# Patient Record
Sex: FEMALE | Race: WHITE | NOT HISPANIC OR LATINO | Employment: UNEMPLOYED | URBAN - NONMETROPOLITAN AREA
[De-identification: names, ages, dates, MRNs, and addresses within clinical notes are randomized per-mention and may not be internally consistent; named-entity substitution may affect disease eponyms.]

---

## 2017-03-31 ENCOUNTER — HOSPITAL ENCOUNTER (EMERGENCY)
Facility: HOSPITAL | Age: 42
Discharge: HOME | End: 2017-03-31
Admitting: EMERGENCY MEDICINE
Payer: COMMERCIAL

## 2017-03-31 VITALS
DIASTOLIC BLOOD PRESSURE: 65 MMHG | WEIGHT: 285.72 LBS | RESPIRATION RATE: 18 BRPM | TEMPERATURE: 97.9 F | SYSTOLIC BLOOD PRESSURE: 127 MMHG | HEART RATE: 85 BPM | OXYGEN SATURATION: 96 %

## 2017-03-31 DIAGNOSIS — M54.50 ACUTE LEFT-SIDED LOW BACK PAIN WITHOUT SCIATICA: Primary | ICD-10-CM

## 2017-03-31 LAB — HCG UR QL: NEGATIVE

## 2017-03-31 PROCEDURE — 99283 EMERGENCY DEPT VISIT LOW MDM: CPT

## 2017-03-31 PROCEDURE — 81025 URINE PREGNANCY TEST: CPT | Performed by: PHYSICIAN ASSISTANT

## 2017-03-31 PROCEDURE — 96372 THER/PROPH/DIAG INJ SC/IM: CPT

## 2017-03-31 RX ORDER — CYCLOBENZAPRINE HCL 5 MG
5 TABLET ORAL 3 TIMES DAILY PRN
Qty: 30 TABLET | Refills: 0 | Status: SHIPPED | OUTPATIENT
Start: 2017-03-31

## 2017-03-31 RX ORDER — KETOROLAC TROMETHAMINE 30 MG/ML
30 INJECTION, SOLUTION INTRAMUSCULAR; INTRAVENOUS ONCE
Status: COMPLETED | OUTPATIENT
Start: 2017-03-31 | End: 2017-03-31

## 2017-03-31 RX ORDER — LISINOPRIL AND HYDROCHLOROTHIAZIDE 20; 12.5 MG/1; MG/1
1 TABLET ORAL DAILY
COMMUNITY

## 2017-03-31 RX ORDER — TRAMADOL HYDROCHLORIDE 50 MG/1
50 TABLET ORAL EVERY 6 HOURS PRN
COMMUNITY

## 2017-03-31 RX ADMIN — KETOROLAC TROMETHAMINE 30 MG: 30 INJECTION, SOLUTION INTRAMUSCULAR at 13:23

## 2017-04-19 ENCOUNTER — APPOINTMENT (OUTPATIENT)
Dept: PHYSICAL THERAPY | Facility: CLINIC | Age: 42
End: 2017-04-19
Payer: COMMERCIAL

## 2017-04-19 PROCEDURE — 97140 MANUAL THERAPY 1/> REGIONS: CPT

## 2017-04-19 PROCEDURE — 97014 ELECTRIC STIMULATION THERAPY: CPT

## 2017-04-19 PROCEDURE — 97162 PT EVAL MOD COMPLEX 30 MIN: CPT

## 2017-04-21 ENCOUNTER — APPOINTMENT (OUTPATIENT)
Dept: PHYSICAL THERAPY | Facility: CLINIC | Age: 42
End: 2017-04-21
Payer: COMMERCIAL

## 2017-04-21 PROCEDURE — 97140 MANUAL THERAPY 1/> REGIONS: CPT

## 2017-04-21 PROCEDURE — 97110 THERAPEUTIC EXERCISES: CPT

## 2017-04-21 PROCEDURE — 97014 ELECTRIC STIMULATION THERAPY: CPT

## 2017-04-25 ENCOUNTER — APPOINTMENT (OUTPATIENT)
Dept: PHYSICAL THERAPY | Facility: CLINIC | Age: 42
End: 2017-04-25
Payer: COMMERCIAL

## 2019-01-01 ENCOUNTER — TELEPHONE (OUTPATIENT)
Dept: BEHAVIORAL/MENTAL HEALTH CLINIC | Facility: CLINIC | Age: 44
End: 2019-01-01

## 2019-01-01 ENCOUNTER — SOCIAL WORK (OUTPATIENT)
Dept: BEHAVIORAL/MENTAL HEALTH CLINIC | Facility: CLINIC | Age: 44
End: 2019-01-01
Payer: COMMERCIAL

## 2019-01-01 DIAGNOSIS — F31.30 BIPOLAR AFFECTIVE DISORDER, CURRENT EPISODE DEPRESSED, CURRENT EPISODE SEVERITY UNSPECIFIED (HCC): Primary | ICD-10-CM

## 2019-01-01 DIAGNOSIS — F31.32 MODERATE DEPRESSED BIPOLAR I DISORDER (HCC): Primary | ICD-10-CM

## 2019-01-01 DIAGNOSIS — F31.30 BIPOLAR AFFECTIVE DISORDER, CURRENT EPISODE DEPRESSED, CURRENT EPISODE SEVERITY UNSPECIFIED (HCC): ICD-10-CM

## 2019-01-01 PROCEDURE — T1015 CLINIC SERVICE: HCPCS | Performed by: SOCIAL WORKER

## 2019-04-29 ENCOUNTER — OFFICE VISIT (OUTPATIENT)
Dept: BEHAVIORAL/MENTAL HEALTH CLINIC | Facility: CLINIC | Age: 44
End: 2019-04-29
Payer: COMMERCIAL

## 2019-04-29 DIAGNOSIS — F31.32 MODERATE DEPRESSED BIPOLAR I DISORDER (HCC): Primary | ICD-10-CM

## 2019-04-29 PROBLEM — F31.9 BIPOLAR DISORDER, UNSPECIFIED (HCC): Status: ACTIVE | Noted: 2019-04-29

## 2019-04-29 PROCEDURE — T1015 CLINIC SERVICE: HCPCS | Performed by: FAMILY MEDICINE

## 2019-05-13 ENCOUNTER — OFFICE VISIT (OUTPATIENT)
Dept: BEHAVIORAL/MENTAL HEALTH CLINIC | Facility: CLINIC | Age: 44
End: 2019-05-13
Payer: COMMERCIAL

## 2019-05-13 DIAGNOSIS — F31.32 MODERATE DEPRESSED BIPOLAR I DISORDER (HCC): Primary | ICD-10-CM

## 2019-05-13 PROCEDURE — T1015 CLINIC SERVICE: HCPCS | Performed by: FAMILY MEDICINE

## 2019-05-21 ENCOUNTER — OFFICE VISIT (OUTPATIENT)
Dept: BEHAVIORAL/MENTAL HEALTH CLINIC | Facility: CLINIC | Age: 44
End: 2019-05-21
Payer: COMMERCIAL

## 2019-05-21 DIAGNOSIS — F31.32 MODERATE DEPRESSED BIPOLAR I DISORDER (HCC): Primary | ICD-10-CM

## 2019-05-21 PROCEDURE — T1015 CLINIC SERVICE: HCPCS | Performed by: FAMILY MEDICINE

## 2019-05-31 ENCOUNTER — OFFICE VISIT (OUTPATIENT)
Dept: BEHAVIORAL/MENTAL HEALTH CLINIC | Facility: CLINIC | Age: 44
End: 2019-05-31
Payer: COMMERCIAL

## 2019-05-31 DIAGNOSIS — F31.32 MODERATE DEPRESSED BIPOLAR I DISORDER (HCC): Primary | ICD-10-CM

## 2019-05-31 PROCEDURE — T1015 CLINIC SERVICE: HCPCS | Performed by: FAMILY MEDICINE

## 2019-06-06 ENCOUNTER — OFFICE VISIT (OUTPATIENT)
Dept: BEHAVIORAL/MENTAL HEALTH CLINIC | Facility: CLINIC | Age: 44
End: 2019-06-06
Payer: COMMERCIAL

## 2019-06-06 DIAGNOSIS — F31.32 MODERATE DEPRESSED BIPOLAR I DISORDER (HCC): Primary | ICD-10-CM

## 2019-06-06 PROCEDURE — T1015 CLINIC SERVICE: HCPCS | Performed by: FAMILY MEDICINE

## 2019-06-20 ENCOUNTER — OFFICE VISIT (OUTPATIENT)
Dept: BEHAVIORAL/MENTAL HEALTH CLINIC | Facility: CLINIC | Age: 44
End: 2019-06-20
Payer: COMMERCIAL

## 2019-06-20 DIAGNOSIS — F31.32 MODERATE DEPRESSED BIPOLAR I DISORDER (HCC): Primary | ICD-10-CM

## 2019-06-20 PROCEDURE — T1015 CLINIC SERVICE: HCPCS | Performed by: FAMILY MEDICINE

## 2019-06-28 ENCOUNTER — TELEPHONE (OUTPATIENT)
Dept: BEHAVIORAL/MENTAL HEALTH CLINIC | Facility: CLINIC | Age: 44
End: 2019-06-28

## 2019-06-30 ENCOUNTER — SOCIAL WORK (OUTPATIENT)
Dept: BEHAVIORAL/MENTAL HEALTH CLINIC | Facility: CLINIC | Age: 44
End: 2019-06-30
Payer: COMMERCIAL

## 2019-06-30 DIAGNOSIS — F31.32 MODERATE DEPRESSED BIPOLAR I DISORDER (HCC): Primary | ICD-10-CM

## 2019-06-30 PROCEDURE — T1015 CLINIC SERVICE: HCPCS | Performed by: SOCIAL WORKER

## 2019-08-15 NOTE — TELEPHONE ENCOUNTER
Left a message to schedule appointment with [facility] for Psychiatrist assessment and medication management  Left the phone number of the Client's cell phone as well as the phone [phone]

## 2019-08-15 NOTE — PSYCH
Treatment Plan Tracking    Second Treatment Plan not completed within required time limits due to: Client cancelled/no-showed scheduled appointment  Chuck Ware

## 2019-08-15 NOTE — PSYCH
Armando Eason  1975         Date of Initial Treatment Plan:05/13/2019   Date of Current Treatment Plan: 08/15/19     Treatment Plan Number; Second Treatment Plan     Strengths/Personal Resources for Self Care: Madelin Keenan has a supportive family of origin, and life partner  She has a long history of employment prior to her physical illness  Presently she is open with Dr Cristian Tobar for her physical health at [facility], and this therapist Becca Turcios MSW LCSW at [address] [place] [address]       Diagnosis:   1  Moderate depressed bipolar I disorder (HCC)            Area of Needs: Madelin Keenan has a long history of depression and anxiety which has caused weaknesses within her different relationships and environments, by addressing these issues it is hoped to increase her strengths within those areas  Focusing on these issues  it is hoped that she will achieve or maintain maximum functional capacity in performing daily activities, taking into account both the functional capacity of the individual and those functional capacities appropriate for the individuals of the same age  Reduce or ameliorate the physical mental, behavioral, or developmental effects of an illness, condition, injury or disability   Present treatment plan will cover 4 months or 12 visits which ever comes first       Present treatment plan will cover the next 4 months or 12 visits which ever comes first          Long Term Goal 1: Suyapa's depression score on her PHQ-9 will decrease from 17 to 10 or less     Target Date:05/13/2020  Completion Date:          Short Term Objectives for Goal 1: Madelin Keenan will develop 2 coping skills during the therapic sessions to help avoid napping/sleeping to escape other people and activities       Long Term Goal 2: Suyapa's anxiety score on her KELLIE-7 will decrease from 11 to 5 or less     Target Date: 05/13/2020  Completion Date:      Short Term Objectives for Goal 2: BODØ will learn two new ways of coping with routine stress          Long Term Goal # 3: BODØ will attend all of her medical providers appointments 100% of the time      Target Date: 05/13/2020  Completion Date:         GOAL 1: Modality: The person(s) responsible for carrying out the plan is  BODØ and this therapist Sonia JON     GOAL 2: Modality: The person(s) responsible for carrying out the plan is  BODØ and this therapist Sonia JON      GOAL 3: Modality: The person(s) responsible for carrying out the plan is  BODØ, and her medical providers   [address] [place] [address]: Diagnosis and Treatment Plan explained to Khushi Garibay relates understanding diagnosis and is agreeable to Treatment Plan          Client Comments : Please share your thoughts, feelings, need and/or experiences regarding your treatment plan:     Marthas short term treatment plan will be reviewed/or completed on or before 12/15/2020   __________________________________________________________________     __________________________________________________________________     __________________________________________________________________     __________________________________________________________________     _______________________________________                     Patient signature, Date Time: __________________________________________               Physician cosigner signature, Date, Time: ________________________________
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
## 2019-09-09 NOTE — TELEPHONE ENCOUNTER
Left a message for the Client to contact this  at the [facility] [place]  Phone number was left  [phone]

## 2019-09-11 NOTE — PSYCH
Psychotherapy Provided: Individual Psychotherapy 60  minutes          Goals addressed in session:Long Term Goal Number 2: Zeinab Benítez reported that she met with her new Psychiatrist  Clint Lynn, and has been placed on new medication to address her depression and anxiety  During the session we explored her ace scores and the negative and positive incidents within her life  It is hoped that by addressing her past traumatic history this will lead to increase strengths within her different relationships and environments  Interventions: Supportive Therapy, Strengths Therapy,  and Cognitive Behavioral Therapy where the treatment modalities utilized during the session  Assessment and Plan: Zeinab Benítez presented an anxious mood that was congruent in effect  She was alert, goal directed and participated with prompts during the session  Zeinab Benítez was oriented to person, place, time, situation, and reported no suicidal/homicidal ideation, plan, or intent  As team we conducted the ACE survey and Zeinab Benítez scored 7 out of 10 which places her in the high level for both mental and physical health as well as contact with the criminal justice system  During the session we also conducted screenings on the positive and negative issues within her life  Zeinab Benítez was an active team member during the therapy session  As team Zeinab Benítez and her support team set as her home commitment for her to practice her coping skills daily  Next scheduled appointment was set for 2 weeks  Pain:      PSYCH MENTAL STATUS PAIN :5    PHYSICAL PAIN SCALE NUMBERS:7    Current suicide risk : Low/Phone number for [facility] was given  to the Client [phone] [address]: Diagnosis and Treatment Plan explained to Yesika Alfred, Yesika Alfred  relates understanding diagnosis and is agreeable to Treatment Plan  Yes

## 2019-09-25 NOTE — PSYCH
Psychotherapy Provided: Individual Psychotherapy 60  minutes          Goals addressed in session: Long Term Goal Number One: Maximo Palacios reported her partner is cheating on her with another women at work, this issue has increased her anxiety and depression  During the session we explored setting boundaries within her relationships to prevent environmental and relationship weaknesses  It is hoped that by addressing her boundary issues this will prevent the need for higher level of care and services  Interventions: Supportive Therapy, Strengths Therapy,  and Cognitive Behavioral Therapy where the treatment modalities utilized during the session  Assessment and Plan: Maximo Palacios presented an anxious, depressed mood that was congruent in effect  She was alert, goal directed and participated with prompts during the session  Maximo Palacios was oriented to person, place, time, situation, and reported no suicidal/homicidal ideation, plan, or intent  As team we explored her relationship with her Partner and developing healthy boundaries  Communication patterns where explored as well as coping skills with Maximo Palacios reporting decrease in her depression and anxiety  Next scheduled appointment set of 3 weeks  Maximo Palacios and her support team set as her home commitment to journalize her feelings and utilize her coping skills within her different relationships and environments  Pain:      PSYCH MENTAL STATUS PAIN :5    PHYSICAL PAIN SCALE NUMBERS:7    Current suicide risk : Low/Phone number for [facility] was given  to the Client [phone]    [address]: Diagnosis and Treatment Plan explained to Sanju Archuleta, Sanju Archuleta  relates understanding diagnosis and is agreeable to Treatment Plan  Yes

## 2019-10-15 NOTE — PSYCH
Psychotherapy Provided: Individual Psychotherapy 60  minutes          Goals addressed in session: Long Term Goal Number 2: The Client reported that her economic issues have improved somewhat  She recently put money on her taxes to prevent her home going up for 's sale  She reported her psychiatric medication are starting to work, and her depression has decreased  During the session we explored her anxiety and depression and the effects on her different relationship and environments  Coping skills where reviewed during the session to address and reduce the effects of  her anxiety and depression  Interventions: Supportive Therapy, Strengths Therapy,  and Cognitive Behavioral Therapy where the treatment modalities utilized during the session  Assessment and Plan:The Client presented an anxious and depressed moods that was congruent in effect  She was alert, goal directed and participated with prompts during the session  The Client was oriented to person, place, time, situation, and reported no suicidal/homicidal ideation, plan, or intent  As team we explored her anxiety and depression  She was able to verbalize the triggers for her depression and anxiety as being economic, relationship, and pain issues  Coping skill container was explored and practiced during the session with the client verbalizing decrease in her anxiety and depression  Next scheduled appointment set for 2 weeks  Pain:      PSYCH MENTAL STATUS PAIN :5    PHYSICAL PAIN SCALE NUMBERS:7    Current suicide risk : Low/Phone number for [facility] was given  to the Client [phone]    [address]: Diagnosis and Treatment Plan explained to Cheko Maldonado, Cheko Maldonado  relates understanding diagnosis and is agreeable to Treatment Plan  Yes

## 2019-10-29 NOTE — PSYCH
Psychotherapy Provided: Individual Psychotherapy 60  minutes          Goals addressed in session:Long Term Goal Number One: The Client reported continued problems within her relationship with her Partner, which has caused a recent spike in her depression  During the session we explored her depression and anxiety within her relationships and environments  It is hoped that by addressing her depression this will prevent the need for higher level of care and services  Interventions: Supportive Therapy, Strengths Therapy,  and Cognitive Behavioral Therapy where the treatment modalities utilized during the session  Assessment and Plan: The Client presented an anxious depressed mood that was congruent in effect  She was alert, goal directed and participated with prompts during the session  The Client was oriented to person, place, time, situation, and reported no suicidal/homicidal ideation, plan, or intent  As team we explored her depression and the connection with her relationship with her Partner  Communication skills here reviewed and practiced during the session to help address her relationship weaknesses  Coping skills where reviewed during the session with the Client reporting decrease in her depression  The Client set as her home commitment to continue to practice her communication skills within her relationship with her Partner  Next Scheduled appointment set for 2 weeks  Pain:      PSYCH MENTAL STATUS PAIN :5    PHYSICAL PAIN SCALE NUMBERS:7- Due to Back Pain    Current suicide risk : Low/Phone number for [facility] was given  to the Client [address]: Diagnosis and Treatment Plan explained to Tay Morfin, Tay Morfin  relates understanding diagnosis and is agreeable to Treatment Plan  Yes

## 2019-11-12 NOTE — PSYCH
Psychotherapy Provided: Individual Psychotherapy 60  minutes          Goals addressed in session:Long Term Number One: The Client reported that she has been having breakthroughs in her depression, and has been addressing this issue with her psychiatric medication provider  As team we explored her strengths and weaknesses and heathy ways to address her depression through coping skills  It is hoped that by addressing her depression this will prevent the need for higher level of care ans services  Interventions: Supportive Therapy, Strengths Therapy,  and Cognitive Behavioral Therapy where the treatment modalities utilized during the session  Assessment and Plan:The Client presented a depressed, anxious mood that was congruent in effect  She was alert, goal directed and participated with prompts during the session  The Client was oriented to person, place, time, situation, and reported no suicidal/homicidal ideation, plan, or intent  As team we explored her personal and environmental strengths, with the Client being able to verbalize strong family, friends, and resiliency  She was able to identify her weakness as being her relationship with her partner, her physical health and economic issues  Community support list was given to the Client to help with her economic issues  Review of her coping skills was conducted during the session with the Client reporting feeling calmer  As her home commitment the Client will utilize her coping skills when presented with anxiety and or depression  Next appointment was set for 2 weeks       Pain:      PSYCH MENTAL STATUS PAIN :6 Client reported depression     PHYSICAL PAIN SCALE NUMBERS:8 Client reported pain in her back    Current suicide risk : Low/Phone number for [facility] was given  to the Client [phone] [address]: Diagnosis and Treatment Plan explained to Nuno Morales, Nuno Morales  relates understanding diagnosis and is agreeable to Treatment Plan  Yes

## 2019-11-27 NOTE — PSYCH
Psychotherapy Provided: Individual Psychotherapy 60  minutes          Goals addressed in session: Long Term Goal Number One: The Client reported that she is still suffering from pain in her back, which has increased her depression and anxiety  She did report positive results with her son at his 2 year check with his pediatrician   "the doctor told me he has no issue and is on level" During the session we explored ages and stages and the effects of her worry on her relationships  It is hoped that by addressing her weaknesses this will prevent the need for higher level of care  Interventions: Supportive Therapy, Strengths Therapy,  and Cognitive Behavioral Therapy where the treatment modalities utilized during the session  Assessment and Plan:The Client presented an anxious depressed mood that was congruent in effect  She was alert, goal directed and participated with prompts during the session  The Client was oriented to person, place, time, situation, and reported no suicidal/homicidal ideation, plan, or intent  As team we explored children's ages and stages in development  The Client reported feeling calmer after completion of this activity, with increased awareness of her son's growth  During the session we explored how her stress effects her mental health and relationships  Coping skills where reviewed to address her mental health  The Client and her support team set as her home commitment to journalize her feelings and moods  Pain:      PSYCH MENTAL STATUS PAIN :5-Depression and anxiety    PHYSICAL PAIN SCALE NUMBERS:7-Back Pain    Current suicide risk : Low/Phone number for [facility] was given  to the Client [phone] [address]: Diagnosis and Treatment Plan explained to Tay Morfin, Tay Morfin  relates understanding diagnosis and is agreeable to Treatment Plan  Yes

## 2019-12-11 NOTE — PSYCH
Ольга Men  1975         Date of Initial Treatment Plan:05/13/2019   Date of Current Treatment Plan: 12/12/2019     Treatment Plan Number; Third Treatment Plan     Strengths/Personal Resources for Self Care: Gerber Arguello has a supportive family of origin, and life partner  She has a long history of employment prior to her physical illness  Presently she is open with Dr Lopez Cortes for her physical health at [facility], and this therapist Jeannette Amador MSW LCSW at [address]       Diagnosis:   1  Moderate depressed bipolar I disorder (HCC)            Area of Needs: Gerber Arguello has a long history of depression and anxiety which has caused weaknesses within her different relationships and environments, by addressing these issues it is hoped to increase her strengths within those areas  Focusing on these issues  it is hoped that she will achieve or maintain maximum functional capacity in performing daily activities, taking into account both the functional capacity of the individual and those functional capacities appropriate for the individuals of the same age  Reduce or ameliorate the physical mental, behavioral, or developmental effects of an illness, condition, injury or disability   Present treatment plan will cover 4 months or 12 visits which ever comes first        Present treatment plan will cover the next 4 months or 12 visits which ever comes first          Long Term Goal 1: Suyapa's depression score on her PHQ-9 will decrease from 17 to 10 or less (emerging)     Target Date:05/13/2020  Completion Date:          Short Term Objectives for Goal 1: Suyapa will develop 2 coping skills during the therapic sessions to help avoid napping/sleeping to escape other people and activities  (emerging)     Long Term Goal 2: Suyapa's anxiety score on her KELLIE-7 will decrease from 11 to 5 or less (emerging)     Target Date: 05/13/2020  Completion Date:    Short Term Objectives for Goal 2: Suyapa will learn two new ways of coping with routine stress          Long Term Goal # 3: Quiana Potts will attend all of her medical providers appointments 100% of the time      Target Date: 05/13/2020  Completion Date:         GOAL 1: Modality: The person(s) responsible for carrying out the plan is  Suyapa and this therapist Miquel COTTON [facility]     GOAL 2: Modality: The person(s) responsible for carrying out the plan is  Suyapa and this therapist Miquel COTTON [facility]      GOAL 3: Modality: The person(s) responsible for carrying out the plan is  Suyapa, and her medical providers         [address] [place] [address]: Diagnosis and Treatment Plan explained to Suyapa Dale relates understanding diagnosis and is agreeable to Treatment Plan          Client Comments : Please share your thoughts, feelings, need and/or experiences regarding your treatment plan:     Suyapa's short term treatment plan will be reviewed/or completed on or before 04/12/2020   __________________________________________________________________     __________________________________________________________________     __________________________________________________________________     __________________________________________________________________     _______________________________________                    Patient signature, Date Time: __________________________________________               Physician cosigner signature, Date, Time: ________________________________

## 2019-12-12 NOTE — PSYCH
Psychotherapy Provided: Individual Psychotherapy 60  minutes          Goals addressed in session:Creation of Second Treatment Plan: The Client reported problems within her relationships with her friend "Howard Raya" Reviewed of healthy boundaries reviewed during the session as well as creation of her recovery treatment plan  It is hoped that by addressing the Client's weaknesses this will prevent the need for higher level of care and services  Interventions: Supportive Therapy, Strengths Therapy,  and Cognitive Behavioral Therapy where the treatment modalities utilized during the session  Assessment and Plan: The Client presented an anxious depressed mood that was congruent in effect  She was alert, goal directed and participated with prompts during the session  The Client was oriented to person, place, time, situation, and reported no suicidal/homicidal ideation, plan, or intent  As team we explored boundaries with her relationship and healthy coping  skills in addressing her porous boundaries  During the session we also created her recovery treatment plan addressing her depression and anxiety  As her home commitment the Client will journalize her feelings and moods Next scheduled appointment 1 week  Pain:      PSYCH MENTAL STATUS PAIN :5    PHYSICAL PAIN SCALE NUMBERS:7    Current suicide risk : Low/Phone number for [facility] was given  to the Client [phone] [address]: Diagnosis and Treatment Plan explained to Marquetta Heimlich, Marquetta Heimlich  relates understanding diagnosis and is agreeable to Treatment Plan  Yes

## 2019-12-18 NOTE — PSYCH
Psychotherapy Provided: Individual Psychotherapy 60  minutes          Goals addressed in session:Long Term Goal Number One: The Client reported she has been having an increase in her anxiety and depression over relationship and environmental issues  As team we explored the clinical and personal symptoms of her depression and herbie  It is hoped that by addressing her bipolar symptoms  this will act as a preventive measure for  the need for higher level of care and services  Interventions:Supportive Therapy, Strengths Therapy,  and Cognitive Behavioral Therapy where the treatment modalities utilized during the session  Assessment and Plan:The Client presented a depressed anxious mood that was congruent in effect  She was alert, goal directed and participated with prompts during the session  The Client was oriented to person, place, time, situation, and reported no suicidal/homicidal ideation, plan, or intent  As team we explored the clinical symptoms of bipolar and the symptoms of bipolar that the Client as well experiences  The Client was an active team member during the session  As team we reviewed her coping skills to decrease her weaknesses within her different relationships and environments  As her home commitment the Client will utilize her coping skill of grounding when presented with anxiety and or depression  Next scheduled appointment was set for 2 weeks  Pain:      PSYCH MENTAL STATUS PAIN :5 as reported by the Client due to depression over the holiday  PHYSICAL PAIN SCALE NUMBERS:6 as reported by the Client due to back pain  Current suicide risk : Low/Phone number for [facility] was given  to the Client [phone] [address]: Diagnosis and Treatment Plan explained to Isaias Cortes, Isaias Cortes  relates understanding diagnosis and is agreeable to Treatment Plan  Yes

## 2020-01-01 ENCOUNTER — TELEMEDICINE (OUTPATIENT)
Dept: BEHAVIORAL/MENTAL HEALTH CLINIC | Facility: CLINIC | Age: 45
End: 2020-01-01
Payer: COMMERCIAL

## 2020-01-01 ENCOUNTER — APPOINTMENT (EMERGENCY)
Dept: RADIOLOGY | Facility: HOSPITAL | Age: 45
End: 2020-01-01
Payer: COMMERCIAL

## 2020-01-01 ENCOUNTER — HOSPITAL ENCOUNTER (EMERGENCY)
Facility: HOSPITAL | Age: 45
End: 2020-07-13
Attending: EMERGENCY MEDICINE | Admitting: EMERGENCY MEDICINE
Payer: COMMERCIAL

## 2020-01-01 ENCOUNTER — TELEPHONE (OUTPATIENT)
Dept: BEHAVIORAL/MENTAL HEALTH CLINIC | Facility: CLINIC | Age: 45
End: 2020-01-01

## 2020-01-01 ENCOUNTER — DOCUMENTATION (OUTPATIENT)
Dept: BEHAVIORAL/MENTAL HEALTH CLINIC | Facility: CLINIC | Age: 45
End: 2020-01-01

## 2020-01-01 VITALS
TEMPERATURE: 95.6 F | DIASTOLIC BLOOD PRESSURE: 73 MMHG | OXYGEN SATURATION: 84 % | RESPIRATION RATE: 145 BRPM | SYSTOLIC BLOOD PRESSURE: 115 MMHG

## 2020-01-01 DIAGNOSIS — F31.30 BIPOLAR AFFECTIVE DISORDER, CURRENT EPISODE DEPRESSED, CURRENT EPISODE SEVERITY UNSPECIFIED (HCC): ICD-10-CM

## 2020-01-01 DIAGNOSIS — F31.32 MODERATE DEPRESSED BIPOLAR I DISORDER (HCC): Primary | ICD-10-CM

## 2020-01-01 DIAGNOSIS — R06.03 RESPIRATORY DISTRESS: ICD-10-CM

## 2020-01-01 DIAGNOSIS — I46.9 CARDIAC ARREST (HCC): Primary | ICD-10-CM

## 2020-01-01 DIAGNOSIS — F31.30 BIPOLAR AFFECTIVE DISORDER, CURRENT EPISODE DEPRESSED, CURRENT EPISODE SEVERITY UNSPECIFIED (HCC): Primary | ICD-10-CM

## 2020-01-01 DIAGNOSIS — R14.0 ABDOMINAL DISTENTION: ICD-10-CM

## 2020-01-01 LAB
APAP SERPL-MCNC: <2 UG/ML (ref 10–20)
APTT PPP: 39 SECONDS (ref 23–37)
ATRIAL RATE: 95 BPM
ATRIAL RATE: 96 BPM
ETHANOL SERPL-MCNC: <3 MG/DL (ref 0–3)
GLUCOSE SERPL-MCNC: 230 MG/DL (ref 65–140)
INR PPP: 1.96 (ref 0.84–1.19)
P AXIS: 58 DEGREES
P AXIS: 63 DEGREES
PR INTERVAL: 176 MS
PR INTERVAL: 176 MS
PROTHROMBIN TIME: 22.5 SECONDS (ref 11.6–14.5)
QRS AXIS: 111 DEGREES
QRS AXIS: 113 DEGREES
QRSD INTERVAL: 82 MS
QRSD INTERVAL: 82 MS
QT INTERVAL: 336 MS
QT INTERVAL: 338 MS
QTC INTERVAL: 422 MS
QTC INTERVAL: 427 MS
SALICYLATES SERPL-MCNC: <3 MG/DL (ref 3–20)
T WAVE AXIS: 38 DEGREES
T WAVE AXIS: 8 DEGREES
TROPONIN I SERPL-MCNC: 0.22 NG/ML
VENTRICULAR RATE: 95 BPM
VENTRICULAR RATE: 96 BPM

## 2020-01-01 PROCEDURE — 94760 N-INVAS EAR/PLS OXIMETRY 1: CPT

## 2020-01-01 PROCEDURE — T1015 CLINIC SERVICE: HCPCS | Performed by: SOCIAL WORKER

## 2020-01-01 PROCEDURE — 99285 EMERGENCY DEPT VISIT HI MDM: CPT

## 2020-01-01 PROCEDURE — 85730 THROMBOPLASTIN TIME PARTIAL: CPT | Performed by: EMERGENCY MEDICINE

## 2020-01-01 PROCEDURE — 96375 TX/PRO/DX INJ NEW DRUG ADDON: CPT

## 2020-01-01 PROCEDURE — 99285 EMERGENCY DEPT VISIT HI MDM: CPT | Performed by: EMERGENCY MEDICINE

## 2020-01-01 PROCEDURE — 92950 HEART/LUNG RESUSCITATION CPR: CPT | Performed by: EMERGENCY MEDICINE

## 2020-01-01 PROCEDURE — 82948 REAGENT STRIP/BLOOD GLUCOSE: CPT

## 2020-01-01 PROCEDURE — 96374 THER/PROPH/DIAG INJ IV PUSH: CPT

## 2020-01-01 PROCEDURE — 80329 ANALGESICS NON-OPIOID 1 OR 2: CPT | Performed by: EMERGENCY MEDICINE

## 2020-01-01 PROCEDURE — 36415 COLL VENOUS BLD VENIPUNCTURE: CPT | Performed by: EMERGENCY MEDICINE

## 2020-01-01 PROCEDURE — 31500 INSERT EMERGENCY AIRWAY: CPT | Performed by: EMERGENCY MEDICINE

## 2020-01-01 PROCEDURE — 93005 ELECTROCARDIOGRAM TRACING: CPT

## 2020-01-01 PROCEDURE — 84484 ASSAY OF TROPONIN QUANT: CPT | Performed by: EMERGENCY MEDICINE

## 2020-01-01 PROCEDURE — 80320 DRUG SCREEN QUANTALCOHOLS: CPT | Performed by: EMERGENCY MEDICINE

## 2020-01-01 PROCEDURE — 71045 X-RAY EXAM CHEST 1 VIEW: CPT

## 2020-01-01 PROCEDURE — 85610 PROTHROMBIN TIME: CPT | Performed by: EMERGENCY MEDICINE

## 2020-01-01 PROCEDURE — 36680 INSERT NEEDLE BONE CAVITY: CPT | Performed by: EMERGENCY MEDICINE

## 2020-01-01 RX ORDER — ROCURONIUM BROMIDE 10 MG/ML
INJECTION, SOLUTION INTRAVENOUS
Status: DISCONTINUED
Start: 2020-01-01 | End: 2020-01-01 | Stop reason: HOSPADM

## 2020-01-01 RX ORDER — SODIUM BICARBONATE 84 MG/ML
INJECTION, SOLUTION INTRAVENOUS CODE/TRAUMA/SEDATION MEDICATION
Status: COMPLETED | OUTPATIENT
Start: 2020-01-01 | End: 2020-01-01

## 2020-01-01 RX ORDER — CEFEPIME HYDROCHLORIDE 2 G/50ML
2000 INJECTION, SOLUTION INTRAVENOUS ONCE
Status: DISCONTINUED | OUTPATIENT
Start: 2020-01-01 | End: 2020-01-01 | Stop reason: HOSPADM

## 2020-01-01 RX ORDER — EPINEPHRINE 0.1 MG/ML
SYRINGE (ML) INJECTION CODE/TRAUMA/SEDATION MEDICATION
Status: COMPLETED | OUTPATIENT
Start: 2020-01-01 | End: 2020-01-01

## 2020-01-01 RX ADMIN — EPINEPHRINE 1 MG: 0.1 INJECTION, SOLUTION ENDOTRACHEAL; INTRACARDIAC; INTRAVENOUS at 00:29

## 2020-01-01 RX ADMIN — EPINEPHRINE 1 MG: 0.1 INJECTION, SOLUTION ENDOTRACHEAL; INTRACARDIAC; INTRAVENOUS at 00:26

## 2020-01-01 RX ADMIN — EPINEPHRINE 1 MG: 0.1 INJECTION, SOLUTION ENDOTRACHEAL; INTRACARDIAC; INTRAVENOUS at 00:34

## 2020-01-01 RX ADMIN — SODIUM BICARBONATE 50 MEQ: 84 INJECTION, SOLUTION INTRAVENOUS at 00:31

## 2020-01-01 RX ADMIN — SODIUM BICARBONATE 50 MEQ: 84 INJECTION, SOLUTION INTRAVENOUS at 00:35

## 2020-01-01 RX ADMIN — EPINEPHRINE 1 MG: 0.1 INJECTION, SOLUTION ENDOTRACHEAL; INTRACARDIAC; INTRAVENOUS at 00:38

## 2020-01-06 NOTE — PSYCH
Psychotherapy Provided: Individual Psychotherapy 60  minutes          Goals addressed in session:Creation of Recovery Crisis Plan/Long Term Goal Number One: The Client reported that she has started seeing a new physician at [facility], due to problems with communication from her prior physician  During the session we created her recovery crisis plan to address her weaknesses within her relationships and environments  It is hoped that by addressing her weaknesses this will prevent the need for higher level of care and services  Interventions: Supportive Therapy, Strengths Therapy,  and Cognitive Behavioral Therapy where the treatment modalities utilized during the session  Assessment and Plan:The Client presented an anxious depressed mood that was congruent in effect  She was alert, goal directed and participated with prompts during the session  The Client was oriented tos person, place, time, situation, and reported no suicidal/homicidal ideation, plan, or intent  As team we created her recovery Crisis plan during the session with the Client   She identified her triggers as being financial issues, something being said about her family, "Laurie Sneed being cheated on  Her coping skills where driving, loud music, talking with Maynor Benito, driving with [employer], and grounding  The Client was an active team member during the session  The Client will chart her feelings and moods  Next scheduled appointment was set for 2 weeks  Pain:      PSYCH MENTAL STATUS PAIN :4 as reported by the Client due to increased depression over health issues  PHYSICAL PAIN SCALE NUMBERS:7 as reported by the Client due to back pain  Current suicide risk : Low/Phone number for [facility] was given  to the Client [phone] [address]: Diagnosis and Treatment Plan explained to Isaias Cortes, Isaias Cortes  relates understanding diagnosis and is agreeable to Treatment Plan  Yes

## 2020-01-19 NOTE — PSYCH
Psychotherapy Provided: Individual Psychotherapy 60  minutes          Goals addressed in session:Long Term Goal Number One:The Client reported continued problems within her relationship with her partner, which has increased her negative self esteem  During the session we explored her self-esteem and the effects on her different relationships and environments  Interventions: Supportive Therapy, Strengths Therapy,  and Cognitive Behavioral Therapy where the treatment modalities utilized during the session  Assessment and Plan:The Client presented an anxious depressed mood that was congruent In effect  She was alert, goal directed and participated with prompts during the session  The Client was oriented to person, place, time, situation, and reported no suicidal/homicidal ideation, plan, or intent  As team we explored her personal strengths and qualities, covering what she is good at, things that make her unique, challenges she has overcome, how she has helped others and what she values most  The Client was an active team member, as her home commitment the Client and her support team set for her to complete worksheet on personal growth  Next scheduled appointment set for 2 weeks  Pain:      PSYCH MENTAL STATUS PAIN :5    PHYSICAL PAIN SCALE NUMBERS:6 as reported by the Client due to back pain    Current suicide risk : Low/Phone number for [facility] was given  to the Client Devon Lyle: Diagnosis and Treatment Plan explained to Ted Felty, Ted Felty  relates understanding diagnosis and is agreeable to Treatment Plan  Yes

## 2020-01-24 NOTE — TELEPHONE ENCOUNTER
Returned phone call to the Client, appointment  She reported that she was turned down for SSDI, and had a bad  Test result   Appointment was scheduled 1/26/20202

## 2020-01-24 NOTE — TELEPHONE ENCOUNTER
Attempted to return phone call to the Client left a message on her voicemail to contact this therapist at the [address] [place] [address]   Phone Number was left [phone]

## 2020-01-26 NOTE — PSYCH
Psychotherapy Provided: Individual Psychotherapy 60  minutes          Goals addressed in session:Long Term Goal Number One: The Client reported that she was turned down for SSDI and they have found a "Lump" in her breast, she reported increase in her depression and problems handling the situation  " I don't want to hurt myself or anyone, but I need to talk about this crap" Earlier appointment was scheduled to address her increased issues within her environments  As team we explored her relationships weaknesses, within her relationships and environments  It is hoped that by addressing her weaknesses this will prevent the need for higher level of care and services  Interventions:Supportive Therapy, Strengths Therapy, Solutions Focused and Cognitive Behavioral Therapy where the treatment modalities utilized during the session  Assessment and Plan:The Client presented a depressed anxious mood that was congruent in effect  She was alert, goal directed and participated with prompts during the session  The Client was oriented to person, place, time, situation, and reported no suicidal/homicidal ideation, plan, or intent  As team we explored her relationship weaknesses, and the effects on her mental health  Coping skills where reviewed with the Client reporting feeling calmer  During the session we also explored healthy boundaries, and coping skills, within relationship with her Partner  The Client was active team member  Next scheduled appointment 1 week  Pain:      PSYCH MENTAL STATUS PAIN :5 due relationship issues  PHYSICAL PAIN SCALE NUMBERS:7 due to pain in the hand     Current suicide risk : Low/Phone number for [facility] was given  to the Client [phone] [address]: Diagnosis and Treatment Plan explained to Sharmila Bustillos, Sharmila Bustillos  relates understanding diagnosis and is agreeable to Treatment Plan  Yes

## 2020-02-03 NOTE — PSYCH
Psychotherapy Provided: Individual Psychotherapy 60  minutes          Goals addressed in session:Long Term Goal One: The Client reported that she was turned down for SSI and SSDI this has increased her anxiety and depression  During the session we explored her emotions and coping skills to address the loss that she has encountered  In addressing her weaknesses thorough her goals it is hoped that this will prevent the need for higher level of care and services  Interventions: Supportive Therapy, Strengths Therapy,  and Cognitive Behavioral Therapy where the treatment modalities utilized during the session  Assessment and Plan:The Client presented a depressed anxious depressed mood that was congruent in effect  She was alert, goal directed and participated with prompts during the session  The Client was oriented to person, place, time, situation, and reported no suicidal/homicidal ideation, plan, or intent  As team we processed the issue of her not obtaining SSI or SSDI, and the effects on her different relationships and environments  During the session her goals where reviewed with the Client reporting some decrease in her anxiety and depression  As her home commitment the Client will practice her coping skills when presented with anxiety and or depression  Next scheduled appointment set for 1 weeks  Pain:      PSYCH MENTAL STATUS PAIN :7 after coping skills 5 as reported by the Client     PHYSICAL PAIN SCALE NUMBERS:7 as reported by the Client due to back and leg pain   Current suicide risk : Low Phone number for [facility] was given  to the Client [phone] [address]: Diagnosis and Treatment Plan explained to Vira Goldman, Vira Goldman  relates understanding diagnosis and is agreeable to Treatment Plan  Yes

## 2020-02-13 NOTE — PSYCH
Psychotherapy Provided: Individual Psychotherapy 60  minutes          Goals addressed in session:Long Term Goal Number One: The Client reported that she has reapplied for SSI and will be going through an  to help with this process  She reported continued mood swings which has been effecting her different relationships  During the session we explored healthy ways to address her moods as well as conducted screening on D&A  It is hoped that by addressing her weaknesses this will prevent the need for higher level of care and services  Interventions: Supportive Therapy, Strengths Therapy,  and Cognitive Behavioral Therapy where the treatment modalities utilized during the session  Assessment and Plan:The Client presented a depressed mood that was congruent in effect  She was alert, goal directed and participated with prompts during the session  The Client was oriented to person, place, time, situation, and reported no suicidal/homicidal ideation, plan, or intent  As team we explored her cycling of her moods and the effects on her different relationships and environments  She was able to verbalize that the Cycling has caused problems within her relationships and increased conflicts  Healthy communication pattens where explored with the stressing of using I statements of addressing her needs  Review and practice  of the coping skill and grounding was conducted with the client reporting some relief in her depression  During the session we also conducted screenings for Drugs using the ELOISA and alcohol with the screening AUDIT-C with the Client scoring a negative screening at this time, so no other interventions are needed at this time  The Client and her support team set as her home commitment for her to practice grounding when presented with anxiety and or depression  Next appointment was set for 2 weeks       Pain:      PSYCH MENTAL STATUS PAIN :7 as reported by the Client after grounding 5    PHYSICAL PAIN SCALE NUMBERS:7 due to back pain    Current suicide risk : Low/Phone number for [facility] was given  to the Client [phone] [address]: Diagnosis and Treatment Plan explained to Felix Moulton, Felix Moulton  relates understanding diagnosis and is agreeable to Treatment Plan  Yes

## 2020-02-27 NOTE — PSYCH
Psychotherapy Provided: Individual Psychotherapy 60  minutes          Goals addressed in session:Long Term Goal Number One: The Client reported that he car was repossessed due to nonpayment, by her partner  She reported this has increased her anxiety and depression over this incident and loss of trust within her relationship with her Param[employer]  During the session we explored and processed her  feelings and mood  It is hoped that by addressing her weaknesses this will prevent the need for higher level of care and services  Interventions: Supportive Therapy, Strengths Therapy,  and Cognitive Behavioral Therapy where the treatment modalities utilized during the session  Assessment and Plan:The Client presented a depressed, anxious, angry mood that was congruent in effect  She was alert, goal directed and participated with prompts  The Client was oriented to person, place, time, situation, and reported no suicidal/homicidal ideation, plan, or intent  As team we explored her relationship issues with her Partner and  How economic issues could effect her relationships  Review of her coping skills was conducted during the session with the Client reporting feeling calmer after completion of the coping skill grounding  The Client was an active team member during the session  As her home commitment the Client and her support team set for her to practice grounding daily and or when present with anxiety and or depression  Next appointment set for 2 weeks  Pain:      PSYCH MENTAL STATUS PAIN :5 as reported by the Client due to anxiety and depression       PHYSICAL PAIN SCALE NUMBERS:7 as reported by the Client due back pain    Current suicide risk : Low/Phone number for [facility] was given  to the Client [phone] [address]: Diagnosis and Treatment Plan explained to Ted Felty, Ted Felty  relates understanding diagnosis and is agreeable to Treatment Plan  Yes

## 2020-02-27 NOTE — PROGRESS NOTES
02/27/2020 Phone number for [facility] [phone] was given to the Client to help with her recent repossession of her automobile   [phone]

## 2020-03-23 NOTE — PSYCH
Psychotherapy Provided: Individual Psychotherapy 30  Minutes/Virtural session  Goals addressed in session:Long Term Goal Number One- The Client reported that her and her Partner obtained a new automobile due to the previous automobile being repossessed  She reported continued depression and anxiety over financial, health and relationship issues  Duirng the session we conducted screenings on her depressin, anxiety and healthy ways to process her feelings and emotions  It is hoped that by addressing her weaknesses's this will prevent the need for higher level of care and services  Interventions: Supportive Therapy, Strengths Therapy,  and Cognitive Behavioral Therapy where the treatment modalities utilized during the session  Assessment and Plan: The Client presented a depressed anxious mood that was congruent in effect  She was alert, goal directed and participated with prompts during the session  The Client was oriented to person, place, time, situation, and reported no suicidal/homcidal ideation, plan, or intent  As team during the session we conducted her depression screeinging  Using the PHQ-9 with the Client scoring in the moderate severe depression severity range  On the anxiety screening KELLIE-7 the Client scored in the severe anxiety depression range  She was able to verbalize the causes for her increased anxiety being her Partner not being truthful as well as economic issues  Review of heathy boundaries was conducted during the session, as well as review of coping skills with the Client reporting feeling Calmer  Next scheduled appointment was set for 1 week       Pain:      PSYCH MENTAL STATUS PAIN :5 due to depression over relationship issues    PHYSICAL PAIN SCALE NUMBERS:5 due to back pain and head ache     Current suicide risk : Low/Phone number for [facility] was given  to the Client Devon Kailas Dariela: Diagnosis and Treatment Plan explained to Opal West, Opal West  relates understanding diagnosis and is agreeable to Treatment Plan  Yes

## 2020-03-31 NOTE — PSYCH
Psychotherapy Provided: Individual Psychotherapy 60  minutes 11:59 AM to 12:59 PM         Goals addressed in session:(long Term Goal Number One) Client reported continued anxiety and depression over her relationships with her partner, which has been effecting her different relationships and environments  During the session we explored and processed her emotions and processed her feelings  Interventions: Supportive Therapy, Strengths Therapy,  and Cognitive Behavioral Therapy where the treatment modalities utilized during the session  Assessment and Plan:The Client presented a depressed anxious mood that was congruent in effect  She was alert, goal directed and participated with prompts during the session  The Client was oriented to person, place, time, situation, and reported no suicidal/homicidal ideation, plan, or intent  As team we explored her relationship and the effects on her mental health  Coping skills where reviewed to help with her anxiety and depression  The Client was an active team member during the session  As her home commitment the Client will chart her feelings and moods next appointment was set for 1 week  Pain:      PSYCH MENTAL STATUS PAIN :7 as reported by the Client after grounding 5     PHYSICAL PAIN SCALE NUMBERS:9 due to back pain and leg pain    Current suicide risk : Low/Phone number for [facility] was given  to the Client [address]: Diagnosis and Treatment Plan explained to Marquetta Heimlich, Marquetta Heimlich  relates understanding diagnosis and is agreeable to Treatment Plan  Yes

## 2020-04-06 NOTE — PSYCH
Virtual Brief Visit    Problem List Items Addressed This Visit     None      Visit Diagnoses     Moderate depressed bipolar I disorder ([facility] [address] )    -  Primary                Reason for visit is depression and personal relationships    Encounter provider PENELOPE Olivares    Provider located at [address] [place] [id number]    Recent Visits  Date Type Provider Dept   03/31/20 Telemedicine PENELOPE Olivares [facility]   Showing recent visits within past 7 days and meeting all other requirements     Future Appointments  No visits were found meeting these conditions  Showing future appointments within next 150 days and meeting all other requirements        After connecting through , the patient was identified by name and date of birth  Marquetta Heimlich was informed that this is a telemedicine visit and that the visit is being conducted through   She acknowledged consent and understanding of privacy and security of the video platform  The patient has agreed to participate and understands they can discontinue the visit at any time  Patient is aware this is a billable service  Subjective  Marquetta Heimlich is a 38 y o  female , seen for individual therapy  Past Medical History:   Diagnosis Date    Depression     Herniated disc, cervical     Hyperlipidemia        No past surgical history on file      Current Outpatient Medications   Medication Sig Dispense Refill    Acetaminophen (TYLENOL PO) Take 1,000 mg by mouth every 4 (four) hours as needed      cyclobenzaprine (FLEXERIL) 5 mg tablet Take 1 tablet by mouth 3 (three) times a day as needed for muscle spasms for up to 30 doses 30 tablet 0    diclofenac sodium (VOLTAREN) 1 % Apply 2 g topically 4 (four) times a day for 30 days 1 Tube 0    DULoxetine HCl (CYMBALTA PO) Take 30 mg by mouth daily        IRON PO Take 325 mg by mouth        lisinopril-hydrochlorothiazide (PRINZIDE,ZESTORETIC) 20-12 5 MG per tablet Take 1 tablet by mouth daily      traMADol (ULTRAM) 50 mg tablet Take 50 mg by mouth every 6 (six) hours as needed for moderate pain       No current facility-administered medications for this visit  No Known Allergies    Review of Systems    As a result of this visit, I have not  referred the patient for further respiratory evaluation       plain

## 2020-07-13 NOTE — ED NOTES
Pt body placed in [facility],  will  body later today     Torito Lucero RN  07/13/20 5062

## 2020-07-13 NOTE — ED NOTES
Return call from, Karolyn Ro stating they will send a deputy, will notify us of who it will be        Octaviano Daley RN  07/13/20 7595

## 2020-07-13 NOTE — ED NOTES
As per [facility] coroners officer Danielle Garibay pt is a coroners case and will be receiving an autopsy     [facility]  07/13/20 1747

## 2020-07-13 NOTE — ED NOTES
Call placed to [employer] to make aware pt is a coroners case     Brittany Judd, PATITO  07/13/20 0110

## 2020-07-13 NOTE — PROGRESS NOTES
Assessment/Plan:      There are no diagnoses linked to this encounter  Subjective:     Patient ID: Bang Ashley is a 38 y o  female  Outpatient Discharge Summary:   Admission Date:19  Barr Nails was referred by her self  Discharge Date: 2020    Discharge Diagnosis:    No diagnosis found  Treating Therapist: Kerry Mills MSW LCSW  Treatment Complications: Relationship and Environmental issues causing increase in her depression and anxiety   Presenting Problem: depression and anxiety  Course of treatment includes:    individual therapy   Treatment Progress: good  Criteria for Discharge: Client passed away/  Aftercare recommendations include N/A  Discharge Medications include:  Current Outpatient Medications:     Acetaminophen (TYLENOL PO), Take 1,000 mg by mouth every 4 (four) hours as needed, Disp: , Rfl:     cyclobenzaprine (FLEXERIL) 5 mg tablet, Take 1 tablet by mouth 3 (three) times a day as needed for muscle spasms for up to 30 doses, Disp: 30 tablet, Rfl: 0    diclofenac sodium (VOLTAREN) 1 %, Apply 2 g topically 4 (four) times a day for 30 days, Disp: 1 Tube, Rfl: 0    DULoxetine HCl (CYMBALTA PO), Take 30 mg by mouth daily  , Disp: , Rfl:     IRON PO, Take 325 mg by mouth  , Disp: , Rfl:     lisinopril-hydrochlorothiazide (PRINZIDE,ZESTORETIC) 20-12 5 MG per tablet, Take 1 tablet by mouth daily, Disp: , Rfl:     traMADol (ULTRAM) 50 mg tablet, Take 50 mg by mouth every 6 (six) hours as needed for moderate pain, Disp: , Rfl:   No current facility-administered medications for this visit       Prognosis: N/A

## 2020-07-13 NOTE — RESPIRATORY THERAPY NOTE
Called to ER for patient complaining of shortness of breath  Placed patient on BiPAP  Patient was on for about 20 minutes and she then proceeded to become unresponsive and CPR was started  Patient was intubated with 4 LMA tube due to Dr Keira Holland having only 4 grade view of cords and pt was vomiting

## 2020-07-13 NOTE — ED NOTES
Call placed to [facility] requesting the , awaiting a return call back        Benigno Weiner RN  07/13/20 1205

## 2020-07-13 NOTE — ED NOTES
Manju from [address] [facility] called stating they are releasing the body at 7/13/20 1219       Marshall Simms RN  07/13/20 1227

## 2020-07-13 NOTE — ED PROVIDER NOTES
History  Chief Complaint   Patient presents with    Shortness of Breath     with severe ABD pain that started this morning     39 y/o woman arrives from home via EMS  EMS was dispatched for abdominal pain/distention +nausea but she c/o severe dyspnea during my evaluation in the ED  Pain/distention apparently began within past 12 hr and has been a/w significant nausea although no vomiting  Information limited from patient 2/2 her distress  Information taken from patient, EMS, and medical records  No fever/chills/diarrhea/constipation  No cough/wheeze/chest pain/diaphoresis  Per medical records only GI/ surgery is tubal ligation  No apparent pulmonary disease history or VTE hx      Patient was placed on Bipap at arrival which did improve her respiratory distress  ECG was obtained  Blood cultures and labs were obtained  CXR was obtained  Orders were placed with plan for evaluation of intraabdominal/pulmonary etiologies of sx (bowel obstruction, volvulus, pneumonia, etc ) with CT a/p  I suspected that her respiratory distress was 2/2 a severe metabolic disturbance: she had an abnormal abdominal exam that suggested obstruction, volvulus, or other acute abdominal emergency with potential for bowel ischemia  She did not require endotracheal intubation during this initial evaluation as bipap was improving her respiratory status  Empiric abx therapy was ordered  I was then called away to attend to another critically ill patient who required my attention for approximately 10 minutes  I returned to find patient becoming somnolent and c/o nausea, after which she began vomiting dark brown liquid  She was then removed promptly from [facility], orally suctioned, and BVM ventilated  She did decompensate into cardiac arrest at 0020 (PEA rhyythm) and chest compressions were started  IO access was established in the L tibia, and LMA was placed to secure the airway   Medications were administered during cardiac arrest: please refer to medication administration record for details  All subsequent rhythm checks demonstrated PEA  Unfortunately despite maximal resuscitative efforts, ROSC was not achieved and resuscitative efforts were terminated at 0040  Patient's fiance was present in the ED and advised of her death  He stated that she had fallen asleep in the middle of the day on Sunday 12 July, which was unusual for her; he had been unable to wake her up all afternoon  She then awoke spontaneously in the evening c/o severe abdominal pain  He noted her significant abdominal distention also  She had otherwise been in her normal state of health until this episode   was notified and responded to ED  History provided by:  Patient and medical records  Shortness of Breath   Associated symptoms: abdominal pain    Associated symptoms: no chest pain, no cough, no fever, no rash and no vomiting        Prior to Admission Medications   Prescriptions Last Dose Informant Patient Reported? Taking?    Acetaminophen (TYLENOL PO)   Yes No   Sig: Take 1,000 mg by mouth every 4 (four) hours as needed   DULoxetine HCl (CYMBALTA PO)   Yes No   Sig: Take 30 mg by mouth daily     IRON PO   Yes No   Sig: Take 325 mg by mouth     cyclobenzaprine (FLEXERIL) 5 mg tablet   No No   Sig: Take 1 tablet by mouth 3 (three) times a day as needed for muscle spasms for up to 30 doses   diclofenac sodium (VOLTAREN) 1 %   No No   Sig: Apply 2 g topically 4 (four) times a day for 30 days   lisinopril-hydrochlorothiazide (PRINZIDE,ZESTORETIC) 20-12 5 MG per tablet   Yes No   Sig: Take 1 tablet by mouth daily   traMADol (ULTRAM) 50 mg tablet   Yes No   Sig: Take 50 mg by mouth every 6 (six) hours as needed for moderate pain      Facility-Administered Medications: None       Past Medical History:   Diagnosis Date    Asthma     Depression     Herniated disc, cervical     Hyperlipidemia     Hypertension        Past Surgical History:   Procedure Laterality Date    TUBAL LIGATION         History reviewed  No pertinent family history  I have reviewed and agree with the history as documented  E-Cigarette/Vaping     E-Cigarette/Vaping Substances     Social History     Tobacco Use    Smoking status: Former Smoker    Smokeless tobacco: Never Used   Substance Use Topics    Alcohol use: No    Drug use: No       Review of Systems   Constitutional: Negative for chills, fatigue and fever  Eyes: Negative for visual disturbance  Respiratory: Positive for chest tightness and shortness of breath  Negative for cough  Cardiovascular: Negative for chest pain and palpitations  Gastrointestinal: Positive for abdominal distention, abdominal pain and nausea  Negative for diarrhea and vomiting  Skin: Negative for color change, pallor, rash and wound  Hematological: Negative for adenopathy  Does not bruise/bleed easily  All other systems reviewed and are negative  Physical Exam  Physical Exam   Constitutional: She is oriented to person, place, and time  She appears well-developed and well-nourished  She is active  She appears distressed (respiratory distress)  Patient is extremely anxious   HENT:   Head: Normocephalic and atraumatic  Right Ear: Hearing and external ear normal    Left Ear: Hearing and external ear normal    Nose: Nose normal    Neck: Trachea normal and phonation normal  No tracheal tenderness present  No tracheal deviation present  Cardiovascular: Regular rhythm, S1 normal, S2 normal, normal heart sounds and intact distal pulses  Tachycardia present  Exam reveals no gallop and no friction rub  No murmur heard  Pulses:       Radial pulses are 1+ on the right side, and 1+ on the left side  Dorsalis pedis pulses are 1+ on the right side, and 1+ on the left side  Posterior tibial pulses are 1+ on the right side, and 1+ on the left side  Pulmonary/Chest: Breath sounds normal  No stridor  Tachypnea noted  She is in respiratory distress  She has no decreased breath sounds  She has no wheezes  She has no rhonchi  She has no rales  She exhibits no tenderness  Tachypnea with increased respiratory effort   Abdominal: Soft  She exhibits distension (obese with significant distention and diffuse tympany to percussion)  She exhibits no mass  There is generalized tenderness (mild generalized ttp)  There is no rigidity, no rebound, no guarding and no CVA tenderness  Musculoskeletal: She exhibits no edema, tenderness or deformity  Neurological: She is alert and oriented to person, place, and time  She has normal strength  No cranial nerve deficit or sensory deficit  She exhibits normal muscle tone  GCS eye subscore is 4  GCS verbal subscore is 5  GCS motor subscore is 6  PERRLA; EOMI  Sensation intact to light touch over face in V1-V3 distribution bilaterally  Facial expressions symmetric  Tongue/uvula midline  Shoulder shrug equal bilaterally  Strength 5/5 in UE/LE bilaterally  Sensation intact to light touch in UE/LE bilaterally  Skin: Capillary refill takes 2 to 3 seconds  Cool extremities with prolonged capillary refill   Nursing note and vitals reviewed        Vital Signs  ED Triage Vitals   Temperature Pulse Respirations Blood Pressure SpO2   07/13/20 0003 07/13/20 0002 07/13/20 0002 07/13/20 0002 07/13/20 0009   (!) 95 6 °F (35 3 °C) (!) 189 (!) 34 115/73 (!) 84 %      Temp Source Heart Rate Source Patient Position - Orthostatic VS BP Location FiO2 (%)   07/13/20 0003 07/13/20 0002 07/13/20 0002 07/13/20 0002 07/13/20 0100   Temporal Monitor Sitting Left arm 100      Pain Score       --                  Vitals:    07/13/20 0030 07/13/20 0033 07/13/20 0036 07/13/20 0039   BP:       Pulse: (!) 0 (!) 0 (!) 0 (!) 0   Patient Position - Orthostatic VS:             Visual Acuity      ED Medications  Medications   EPINEPHrine (ADRENALIN) injection SOSY (1 mg Intravenous Given 7/13/20 0038)   sodium bicarbonate 50 mEq/50 mL injection (50 mEq Intravenous Given 7/13/20 0035)       Diagnostic Studies  Results Reviewed     Procedure Component Value Units Date/Time    Fingerstick Glucose (POCT) [[id number]]  (Abnormal) Collected:  07/13/20 0040    Lab Status:  Final result Updated:  07/13/20 0145     POC Glucose 230 mg/dl     Troponin I [[id number]]  (Abnormal) Collected:  07/13/20 0030    Lab Status:  Final result Specimen:  Blood Updated:  07/13/20 0119     Troponin I 2 12 ng/mL     Salicylate level [[id number]]  (Abnormal) Collected:  07/13/20 0030    Lab Status:  Final result Specimen:  Blood from Arm, Left Updated:  17/57/38 5502     Salicylate Lvl <3 mg/dL     Acetaminophen level-If concentration is detectable, please discuss with medical  on call  [[id number]]  (Abnormal) Collected:  07/13/20 0030    Lab Status:  Final result Specimen:  Blood from Arm, Left Updated:  07/13/20 0106     Acetaminophen Level <2 0 ug/mL     Protime-INR [[id number]]  (Abnormal) Collected:  07/13/20 0030    Lab Status:  Final result Specimen:  Blood Updated:  07/13/20 0104     Protime 22 5 seconds      INR 1 96    APTT [[id number]]  (Abnormal) Collected:  07/13/20 0030    Lab Status:  Final result Specimen:  Blood Updated:  07/13/20 0104     PTT 39 seconds     Ethanol [[id number]]  (Normal) Collected:  07/13/20 0030    Lab Status:  Final result Specimen:  Blood from Arm, Left Updated:  07/13/20 0103     Ethanol Lvl <3 mg/dL     Procalcitonin [[id number]] Updated:  07/13/20 0044    Lab Status:   In process Specimen:  Blood                  XR chest 1 view portable    (Results Pending)              Procedures  ECG 12 Lead Documentation Only  Date/Time: 7/12/2020 11:55 PM  Performed by: Brittani Causey DO  Authorized by: Brittani Causey DO     Indications / Diagnosis:  96  ECG reviewed by me, the ED Provider: yes    Patient location:  ED  Previous ECG:     Comparison to cardiac monitor: Yes    Interpretation:     Interpretation: abnormal    Quality:     Tracing quality: Limited by artifact (Significant baseline variability)  Rate:     ECG rate:  96    ECG rate assessment: normal    Rhythm:     Rhythm: sinus rhythm    Ectopy:     Ectopy: none    QRS:     QRS axis:  Right    QRS intervals:  Normal  Conduction:     Conduction: abnormal      Abnormal conduction: LPFB    ST segments:     ST segments:  Normal  T waves:     T waves: normal    Comments:      qrs 82 qtc 422  IO Line  Date/Time: 7/13/2020 12:23 AM  Performed by: Jhon Pacheco DO  Authorized by: Jhon Pacheco DO     Patient location:  ED  Consent:     Consent obtained:  Emergent situation  Indication:     Indications: rapid vascular access    Pre-procedure details:     Site preparation:  Alcohol  Anesthesia (see MAR for exact dosages): Anesthesia method:  None  Procedure details:     Insertion site:  Proximal tibia    Laterality:  Left    Insertion device:  15 gauge IO needle and drill device    IO Size:  25mm (blue)    Insertion: Needle was inserted through the bony cortex      Number of attempts:  1    Successful placement: yes      Insertion confirmation:  Aspiration of blood/marrow, easy infusion of fluids and stability of the needle  Intubation  Date/Time: 7/13/2020 12:33 AM  Performed by: Jhon Pacheco DO  Authorized by: Jhon Pacheco DO     Consent:     Consent obtained:  Emergent situation  Pre-procedure details:     Patient status:  Unresponsive    Pretreatment medications:  None    Paralytics:  None  Procedure details:     Preoxygenation:  Bag valve mask    CPR in progress: yes      Intubation method:  Oral    Oral intubation technique:  BERENICE/LMA and direct (Direct laryngoscopy attempt demonstrated only grade 4 view of glottis despite external laryngeal manipulation and head manipulation; #4 LMA was then placed in single attempt with good seal and easy BVM ventilation)    Laryngoscope blade:   Mac 4    Number of attempts:  1  Placement assessment:     Breath sounds:  Equal    Placement verification: equal breath sounds and ETCO2 detector               ED Course       US AUDIT      Most Recent Value   Initial Alcohol Screen: US AUDIT-C    1  How often do you have a drink containing alcohol?  0 Filed at: 2020   2  How many drinks containing alcohol do you have on a typical day you are drinking? 0 Filed at: 2020   3b  FEMALE Any Age, or MALE 65+: How often do you have 4 or more drinks on one occassion? 0 Filed at: 2020   Audit-C Score  0 Filed at: 2020                  ELOISA/DAST-10      Most Recent Value   How many times in the past year have you    Used an illegal drug or used a prescription medication for non-medical reasons?   Never Filed at: 2020 2350            MDM      Disposition  Final diagnoses:   Cardiac arrest [facility])   Abdominal distention   Respiratory distress     Time reflects when diagnosis was documented in both MDM as applicable and the Disposition within this note     Time User Action Codes Description Comment    2020 12:57 AM Elspeth Brought Add [I46 9] Cardiac arrest ([facility] [address] )     2020  7:08 AM Elspeth Brought Add [R14 0] Abdominal distention     2020  7:08 AM Elspeth Brought Add [R06 03] Respiratory distress       ED Disposition     ED Disposition Condition Date/Time Comment       12:57 AM       Follow-up Information    None       Date, Time and Cause of Death    Preliminary Cause of Death:  Respiratory arrest [facility])       Discharge Medication List as of 2020  5:33 AM      CONTINUE these medications which have NOT CHANGED    Details   Acetaminophen (TYLENOL PO) Take 1,000 mg by mouth every 4 (four) hours as needed, Until Discontinued, Historical Med      cyclobenzaprine (FLEXERIL) 5 mg tablet Take 1 tablet by mouth 3 (three) times a day as needed for muscle spasms for up to 30 doses, Starting 3/31/2017, Until Discontinued, Print      diclofenac sodium (VOLTAREN) 1 % Apply 2 g topically 4 (four) times a day for 30 days, Starting 3/31/2017, Until Sun 4/30/17, Print      DULoxetine HCl (CYMBALTA PO) Take 30 mg by mouth daily  , Until Discontinued, Historical Med      IRON PO Take 325 mg by mouth  , Until Discontinued, Historical Med      lisinopril-hydrochlorothiazide (PRINZIDE,ZESTORETIC) 20-12 5 MG per tablet Take 1 tablet by mouth daily, Until Discontinued, Historical Med      traMADol (ULTRAM) 50 mg tablet Take 50 mg by mouth every 6 (six) hours as needed for moderate pain, Until Discontinued, Historical Med           No discharge procedures on file      PDMP Review     None          ED Provider  Electronically Signed by           Delfin Mosqueda DO  07/13/20 0735